# Patient Record
Sex: FEMALE | Race: WHITE | NOT HISPANIC OR LATINO | URBAN - METROPOLITAN AREA
[De-identification: names, ages, dates, MRNs, and addresses within clinical notes are randomized per-mention and may not be internally consistent; named-entity substitution may affect disease eponyms.]

---

## 2019-02-13 ENCOUNTER — ANESTHESIA - HEALTHEAST (OUTPATIENT)
Dept: OBGYN | Facility: CLINIC | Age: 29
End: 2019-02-13

## 2019-02-14 ENCOUNTER — HOME CARE/HOSPICE - HEALTHEAST (OUTPATIENT)
Dept: HOME HEALTH SERVICES | Facility: HOME HEALTH | Age: 29
End: 2019-02-14

## 2019-02-16 ENCOUNTER — HOME CARE/HOSPICE - HEALTHEAST (OUTPATIENT)
Dept: HOME HEALTH SERVICES | Facility: HOME HEALTH | Age: 29
End: 2019-02-16

## 2021-06-24 NOTE — ANESTHESIA PROCEDURE NOTES
Epidural Block    Patient location during procedure: OB  Time Called: 2/13/2019 4:32 AM  Reason for Block:labor epidural  Staffing:  Performing  Anesthesiologist: Himanshu Martinez MD  Preanesthetic Checklist  Completed: patient identified, risks, benefits, and alternatives discussed, timeout performed, consent obtained, airway assessed, oxygen available, suction available, emergency drugs available and hand hygiene performed  Procedure  Patient position: sitting  Prep: ChloraPrep  Patient monitoring: continuous pulse oximetry  Approach: midline  Location: L2-L3  Injection technique: LINUS air  Number of Attempts:1  Needle  Needle type: Checkkrishan   Needle gauge: 18 G     Catheter in Space: 3  Assessment  Sensory level:  No complications      Additional Notes:  Single pass. No paresthesia. No heme. Neg aspirations.

## 2021-06-24 NOTE — ANESTHESIA POSTPROCEDURE EVALUATION
Patient: Aminah Rutledge  * No procedures listed *  Anesthesia type: epidural    Patient location: Labor and Delivery  Last vitals:   Vitals:    02/13/19 1014   BP: 138/86   Pulse: 81   Resp:    Temp:    SpO2:      Post vital signs: stable  Level of consciousness: awake and responds to simple questions  Post-anesthesia pain: pain controlled  Post-anesthesia nausea and vomiting: no  Pulmonary: unassisted, return to baseline  Cardiovascular: stable and blood pressure at baseline  Hydration: adequate  Anesthetic events: no    QCDR Measures:  ASA# 11 - Ashley-op Cardiac Arrest: ASA11B - Patient did NOT experience unanticipated cardiac arrest  ASA# 12 - Ashley-op Mortality Rate: ASA12B - Patient did NOT die  ASA# 13 - PACU Re-Intubation Rate: NA - No ETT / LMA used for case  ASA# 10 - Composite Anes Safety: ASA10A - No serious adverse event    Additional Notes:

## 2022-08-06 NOTE — ANESTHESIA PREPROCEDURE EVALUATION
Anesthesia Evaluation      Patient summary reviewed   No history of anesthetic complications     Airway   Mallampati: II  Neck ROM: full   Pulmonary - negative ROS and normal exam                          Cardiovascular - negative ROS and normal exam  Exercise tolerance: > or = 10 METS   Neuro/Psych - negative ROS     Endo/Other - negative ROS   (+) pregnant     GI/Hepatic/Renal - negative ROS           Dental - normal exam                        Anesthesia Plan  Planned anesthetic: epidural  Anesthesiology Labor Epidural Note    Called to place a labor epidural in this patient in L&D.  Patient ID, interviewed and examined at the bedside with RN present throughout. Discussed with patient the procedure of epidural placement, expectations and risks (bleeding, infection, headache, decreased blood pressure, high block with LOC, and poor analgesia possibly requiring replacement).  I have answered all questions.  She consents to proceed with epidural placement.  See epidural procedure note.    Himanshu Martinez M.D.  ASA 2     Anesthetic plan and risks discussed with: patient    Post-op plan: epidural analgesia          
PAST SURGICAL HISTORY:  H/O vertebroplasty     History of back surgery Kyphoplasty    Status post peripheral artery angioplasty with insertion of stent